# Patient Record
Sex: MALE | Race: OTHER | HISPANIC OR LATINO | URBAN - METROPOLITAN AREA
[De-identification: names, ages, dates, MRNs, and addresses within clinical notes are randomized per-mention and may not be internally consistent; named-entity substitution may affect disease eponyms.]

---

## 2018-12-12 ENCOUNTER — OUTPATIENT (OUTPATIENT)
Dept: OUTPATIENT SERVICES | Facility: HOSPITAL | Age: 28
LOS: 1 days | Discharge: HOME | End: 2018-12-12

## 2018-12-12 DIAGNOSIS — I45.81 LONG QT SYNDROME: ICD-10-CM

## 2019-01-03 ENCOUNTER — OUTPATIENT (OUTPATIENT)
Dept: OUTPATIENT SERVICES | Facility: HOSPITAL | Age: 29
LOS: 1 days | Discharge: HOME | End: 2019-01-03

## 2019-01-03 DIAGNOSIS — I45.81 LONG QT SYNDROME: ICD-10-CM

## 2020-12-10 ENCOUNTER — OUTPATIENT (OUTPATIENT)
Dept: OUTPATIENT SERVICES | Facility: HOSPITAL | Age: 30
LOS: 1 days | End: 2020-12-10
Payer: MEDICAID

## 2020-12-10 VITALS
RESPIRATION RATE: 14 BRPM | WEIGHT: 149.91 LBS | OXYGEN SATURATION: 98 % | SYSTOLIC BLOOD PRESSURE: 113 MMHG | HEART RATE: 50 BPM | HEIGHT: 65 IN | TEMPERATURE: 97 F | DIASTOLIC BLOOD PRESSURE: 56 MMHG

## 2020-12-10 DIAGNOSIS — M24.575 CONTRACTURE, LEFT FOOT: ICD-10-CM

## 2020-12-10 DIAGNOSIS — M20.5X2 OTHER DEFORMITIES OF TOE(S) (ACQUIRED), LEFT FOOT: ICD-10-CM

## 2020-12-10 DIAGNOSIS — M20.42 OTHER HAMMER TOE(S) (ACQUIRED), LEFT FOOT: ICD-10-CM

## 2020-12-10 DIAGNOSIS — Z01.818 ENCOUNTER FOR OTHER PREPROCEDURAL EXAMINATION: ICD-10-CM

## 2020-12-10 DIAGNOSIS — M20.12 HALLUX VALGUS (ACQUIRED), LEFT FOOT: ICD-10-CM

## 2020-12-10 PROCEDURE — G0463: CPT

## 2020-12-10 RX ORDER — SODIUM CHLORIDE 9 MG/ML
1000 INJECTION, SOLUTION INTRAVENOUS
Refills: 0 | Status: DISCONTINUED | OUTPATIENT
Start: 2020-12-18 | End: 2021-01-01

## 2020-12-10 NOTE — H&P PST ADULT - NSICDXPROBLEM_GEN_ALL_CORE_FT
PROBLEM DIAGNOSES  Problem: Other hammer toe(s) (acquired), left foot  Assessment and Plan: Preop instructions provided including npo status, Hibiclens wash for infection control and GI prophylasix. Pt to c/w current meds (prn inhelers), aware to stop any NSAIDS, OTC herbals or MVI on 12/11/20. Verbilized understanding. BW done, pending results. DVT prophylasix as per primary team. MC pending as per surgeon request. Aware to f/u on covid testing prior to sx as per pst protocol and will make an appt for 12/15, information to call provided. Aware to stop any drugs 7 days prior to sx (marijuana and percocet, risks discussed). deneis dependency        PROBLEM DIAGNOSES  Problem: Other hammer toe(s) (acquired), left foot  Assessment and Plan: Preop instructions provided including npo status, Hibiclens wash for infection control and GI prophylasix. Pt to c/w current meds (prn inhelers), aware to stop any NSAIDS, OTC herbals or MVI on 12/11/20. Verbilized understanding. BW pending, will do w/ pcp. DVT prophylasix as per primary team. MC pending as per surgeon request. Aware to f/u on covid testing prior to sx as per pst protocol and will make an appt for 12/15, information to call provided. Aware to stop any drugs 7 days prior to sx (marijuana and percocet, risks discussed). deneis dependency

## 2020-12-10 NOTE — H&P PST ADULT - MUSCULOSKELETAL COMMENTS
DX: other hammer toe(s) (acquired) left foot / other deformities of toe (s) acquired left foot/ contracture, left foot/ Hallux valgus acquired left foot

## 2020-12-10 NOTE — H&P PST ADULT - NSANTHOSAYNRD_GEN_A_CORE
never tested/No. SILVESTRE screening performed.  STOP BANG Legend: 0-2 = LOW Risk; 3-4 = INTERMEDIATE Risk; 5-8 = HIGH Risk

## 2020-12-10 NOTE — H&P PST ADULT - ASSESSMENT
DX: other hammer toe(s) (acquired) left foot / other deformities of toe (s) acquired left foot/ contracture, left foot/ Hallux valgus acquired left foot and evaluated for a scheduled Frank Timothy left foot, tenotomy & capsulotomy digits 2,3,4,5, left foot on 12/18/20

## 2020-12-10 NOTE — H&P PST ADULT - LAB RESULTS AND INTERPRETATION
cbc sent cbc ordered and pending (UE's/ veins w/ scar tissue from Deneis recreational drug use use hx, attempted BW and unable to perform. Pt refuses to go to labs sent and awaiting results while in hospital, wants pcp to perform)

## 2020-12-10 NOTE — H&P PST ADULT - ATTENDING COMMENTS
Physician  Assistant Statement   I have interviewed the patient and reviewed the chart.  There have been no changes in the patient s medical or physical history since his PST's and medical clearance.  HARISH NO
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
## 2020-12-10 NOTE — H&P PST ADULT - HISTORY OF PRESENT ILLNESS
29 y/o M presents to PST w/ a preop dx of other hammer toe(s) (acquired) left foot / other deformities of toe (s) acquired left foot/ contracture, left foot/ Hallux valgus acquired left foot and to be evaluated for a scheduled Frank Timothy left foot, tenotomy & capsulotomy digits 2,3,4,5, left foot on 12/18/20. pt states pain to feet, I work a lot, I have a flat feet and a bunion I work in construction. Went to see Dr Duckworth and recommended surgery at this time, will start w/ left foot.  29 y/o M presents to PST w/ a preop dx of other hammer toe(s) (acquired) left foot / other deformities of toe (s) acquired left foot/ contracture, left foot/ Hallux valgus acquired left foot and to be evaluated for a scheduled Frank Timothy left foot, tenotomy & capsulotomy digits 2,3,4,5, left foot on 12/18/20. pt states pain to feet, "I work a lot, I have a flat feet and bunion, I work in construction". States went to see Dr Duckworth and recommended surgery at this time, " we will start w/ my left foot".

## 2020-12-10 NOTE — H&P PST ADULT - VASCULAR DETAILS
UE's/ veins w/ scar tissue from Dens recreational drug use use hx, attempted BW and unable to perform. Pt refuses to go to labs sent and awaiting results while in hospital, wants pcp to perform.

## 2020-12-10 NOTE — H&P PST ADULT - NSICDXPASTMEDICALHX_GEN_ALL_CORE_FT
PAST MEDICAL HISTORY:  Asthma seasonal, uses prn inhalers, last used summer 2020. no intubations stated     PAST MEDICAL HISTORY:  Asthma seasonal, uses prn inhalers, last used summer 2020. no intubations stated    Opioid abuse takes percocet, states "in the past i used to shoot drugs thru my veins, no longer doing it, no heroin"

## 2020-12-10 NOTE — H&P PST ADULT - NSSTREETDRUGFR_GEN_ALL_CORE_SD
Marijuana QD. States I can be without it" takes it for back pain and feet./daily Marijuana QD. States I can be without it" (percocet and marijuana) takes it for back pain and feet./daily

## 2020-12-10 NOTE — H&P PST ADULT - CORNEAL ABRASION RISK
Deneis eye drop use, + contacts no injuries stated Denies eye drop use, + contacts no injuries stated

## 2020-12-18 ENCOUNTER — OUTPATIENT (OUTPATIENT)
Dept: OUTPATIENT SERVICES | Facility: HOSPITAL | Age: 30
LOS: 1 days | End: 2020-12-18
Payer: MEDICAID

## 2020-12-18 VITALS
OXYGEN SATURATION: 99 % | TEMPERATURE: 97 F | RESPIRATION RATE: 16 BRPM | HEART RATE: 56 BPM | SYSTOLIC BLOOD PRESSURE: 112 MMHG | DIASTOLIC BLOOD PRESSURE: 62 MMHG

## 2020-12-18 VITALS
TEMPERATURE: 97 F | DIASTOLIC BLOOD PRESSURE: 68 MMHG | HEART RATE: 56 BPM | WEIGHT: 149.91 LBS | OXYGEN SATURATION: 97 % | RESPIRATION RATE: 16 BRPM | SYSTOLIC BLOOD PRESSURE: 117 MMHG | HEIGHT: 65 IN

## 2020-12-18 DIAGNOSIS — M20.5X2 OTHER DEFORMITIES OF TOE(S) (ACQUIRED), LEFT FOOT: ICD-10-CM

## 2020-12-18 DIAGNOSIS — M24.575 CONTRACTURE, LEFT FOOT: ICD-10-CM

## 2020-12-18 DIAGNOSIS — M20.42 OTHER HAMMER TOE(S) (ACQUIRED), LEFT FOOT: ICD-10-CM

## 2020-12-18 DIAGNOSIS — M20.12 HALLUX VALGUS (ACQUIRED), LEFT FOOT: ICD-10-CM

## 2020-12-18 LAB
HCT VFR BLD CALC: 43.6 % — SIGNIFICANT CHANGE UP (ref 39–50)
HGB BLD-MCNC: 14.4 G/DL — SIGNIFICANT CHANGE UP (ref 13–17)
MCHC RBC-ENTMCNC: 28 PG — SIGNIFICANT CHANGE UP (ref 27–34)
MCHC RBC-ENTMCNC: 33 GM/DL — SIGNIFICANT CHANGE UP (ref 32–36)
MCV RBC AUTO: 84.8 FL — SIGNIFICANT CHANGE UP (ref 80–100)
NRBC # BLD: 0 /100 WBCS — SIGNIFICANT CHANGE UP (ref 0–0)
PLATELET # BLD AUTO: 350 K/UL — SIGNIFICANT CHANGE UP (ref 150–400)
RBC # BLD: 5.14 M/UL — SIGNIFICANT CHANGE UP (ref 4.2–5.8)
RBC # FLD: 13.3 % — SIGNIFICANT CHANGE UP (ref 10.3–14.5)
WBC # BLD: 11.1 K/UL — HIGH (ref 3.8–10.5)
WBC # FLD AUTO: 11.1 K/UL — HIGH (ref 3.8–10.5)

## 2020-12-18 PROCEDURE — 85027 COMPLETE CBC AUTOMATED: CPT

## 2020-12-18 PROCEDURE — 28298 COR HLX VLGS PRX PHLX OSTEOT: CPT | Mod: TA

## 2020-12-18 PROCEDURE — C1713: CPT

## 2020-12-18 PROCEDURE — 88304 TISSUE EXAM BY PATHOLOGIST: CPT | Mod: 26

## 2020-12-18 PROCEDURE — 88311 DECALCIFY TISSUE: CPT

## 2020-12-18 PROCEDURE — 88304 TISSUE EXAM BY PATHOLOGIST: CPT

## 2020-12-18 PROCEDURE — 28285 REPAIR OF HAMMERTOE: CPT | Mod: XS,T2

## 2020-12-18 PROCEDURE — 73620 X-RAY EXAM OF FOOT: CPT

## 2020-12-18 PROCEDURE — 73620 X-RAY EXAM OF FOOT: CPT | Mod: 26,LT

## 2020-12-18 PROCEDURE — 88311 DECALCIFY TISSUE: CPT | Mod: 26

## 2020-12-18 RX ORDER — SODIUM CHLORIDE 9 MG/ML
1000 INJECTION, SOLUTION INTRAVENOUS
Refills: 0 | Status: DISCONTINUED | OUTPATIENT
Start: 2020-12-18 | End: 2020-12-18

## 2020-12-18 RX ORDER — ALBUTEROL 90 UG/1
2 AEROSOL, METERED ORAL
Qty: 0 | Refills: 0 | DISCHARGE

## 2020-12-18 RX ORDER — ONDANSETRON 8 MG/1
4 TABLET, FILM COATED ORAL ONCE
Refills: 0 | Status: DISCONTINUED | OUTPATIENT
Start: 2020-12-18 | End: 2020-12-18

## 2020-12-18 RX ORDER — FLUTICASONE PROPIONATE AND SALMETEROL 50; 250 UG/1; UG/1
1 POWDER ORAL; RESPIRATORY (INHALATION)
Qty: 0 | Refills: 0 | DISCHARGE

## 2020-12-18 NOTE — ASU DISCHARGE PLAN (ADULT/PEDIATRIC) - CARE PROVIDER_API CALL
Saúl Duckworth  PODIATRIC MEDICINE [facility] SURGERY  [address] [place] [address]  Phone: [phone]  Fax: [phone]  Follow Up Time:

## 2020-12-18 NOTE — BRIEF OPERATIVE NOTE - NSICDXBRIEFPREOP_GEN_ALL_CORE_FT
PRE-OP DIAGNOSIS:  Hammertoe of left foot 18-Dec-2020 13:10:21  Saúl Duckworth  Bunion, left 18-Dec-2020 13:10:11  Saúl Duckworth

## 2020-12-18 NOTE — PRE-ANESTHESIA EVALUATION ADULT - NSANTHADDINFOFT_GEN_ALL_CORE
Discussed IVS in detail with patient and all questions sought and answered. Pt. agrees to all plans and wishes to proceed with surgical care.
Discussed IVS in detail with patient and all questions sought and answered. Pt. agrees to all plans and wishes to proceed with surgical care.

## 2020-12-18 NOTE — ASU PATIENT PROFILE, ADULT - NSSTREETDRUGFR_GEN_ALL_CORE_SD
Marijuana QD. States I can be without it" (percocet and marijuana) takes it for back pain and feet./daily

## 2020-12-18 NOTE — BRIEF OPERATIVE NOTE - NSICDXBRIEFPROCEDURE_GEN_ALL_CORE_FT
PROCEDURES:  Correction of hammertoe 18-Dec-2020 13:09:58  Saúl Duckworth  Frank-Akin bunionectomy 18-Dec-2020 13:09:41  Saúl Duckworth

## 2020-12-18 NOTE — ASU PATIENT PROFILE, ADULT - PMH
Asthma  seasonal, uses prn inhalers, last used summer 2020. no intubations stated  Opioid abuse  takes percocet, states "in the past i used to shoot drugs thru my veins, no longer doing it, no heroin"

## 2020-12-18 NOTE — PRE-ANESTHESIA EVALUATION ADULT - NSANTHOSAYNRD_GEN_A_CORE
never tested/No. SILVESTRE screening performed.  STOP BANG Legend: 0-2 = LOW Risk; 3-4 = INTERMEDIATE Risk; 5-8 = HIGH Risk
never tested/No. SILVESTRE screening performed.  STOP BANG Legend: 0-2 = LOW Risk; 3-4 = INTERMEDIATE Risk; 5-8 = HIGH Risk

## 2020-12-18 NOTE — ASU DISCHARGE PLAN (ADULT/PEDIATRIC) - CALL YOUR DOCTOR IF YOU HAVE ANY OF THE FOLLOWING:
Bleeding that does not stop/Swelling that gets worse/Pain not relieved by Medications/Fever greater than (need to indicate Fahrenheit or Celsius)/Wound/Surgical Site with redness, or foul smelling discharge or pus/Nausea and vomiting that does not stop/Unable to urinate/Excessive diarrhea/Inability to tolerate liquids or foods/Increased irritability or sluggishness Bleeding that does not stop/Swelling that gets worse/Pain not relieved by Medications/Fever greater than (need to indicate Fahrenheit or Celsius)/Wound/Surgical Site with redness, or foul smelling discharge or pus/Numbness, tingling, color or temperature change to extremity/Nausea and vomiting that does not stop/Unable to urinate/Excessive diarrhea/Inability to tolerate liquids or foods/Increased irritability or sluggishness

## 2020-12-18 NOTE — BRIEF OPERATIVE NOTE - NSICDXBRIEFPOSTOP_GEN_ALL_CORE_FT
POST-OP DIAGNOSIS:  Hammertoe of left foot 18-Dec-2020 13:10:43  Saúl Duckworth  Bunion, left foot 18-Dec-2020 13:10:34  Saúl Duckworth

## 2020-12-18 NOTE — ASU DISCHARGE PLAN (ADULT/PEDIATRIC) - NURSING INSTRUCTIONS
drink plenty of fluids,  keep surgical shoe on at all times,  keep left foot elevated at all times when not walking or standing,  use ice packs 15-20 min on/off for pain and edema control

## 2020-12-22 LAB — SURGICAL PATHOLOGY STUDY: SIGNIFICANT CHANGE UP

## 2021-05-06 NOTE — ASU PREOP CHECKLIST - HEIGHT IN INCHES
Patient vitals have been completed and charted.  Medications and allergies were reviewed with patient.     5